# Patient Record
Sex: MALE | URBAN - METROPOLITAN AREA
[De-identification: names, ages, dates, MRNs, and addresses within clinical notes are randomized per-mention and may not be internally consistent; named-entity substitution may affect disease eponyms.]

---

## 2019-05-28 ENCOUNTER — DOCUMENTATION ONLY (OUTPATIENT)
Dept: MATERNAL FETAL MEDICINE | Facility: CLINIC | Age: 24
End: 2019-05-28

## 2019-05-28 DIAGNOSIS — Z31.440 ENCOUNTER OF MALE FOR TESTING FOR GENETIC DISEASE CARRIER STATUS FOR PROCREATIVE MANAGEMENT: Primary | ICD-10-CM

## 2019-05-28 NOTE — PROGRESS NOTES
5/28/2019      Impression/Plan:   1.  Khari accompanied his partner Srini Gallardo to her genetic counseling appointment in [facility] today to discuss her sickle cell trait results and implications for ongoing pregnancy.     2.  In order to clarify risks for her ongoing pregnancy, Khari had blood drawn for hemoglobin electrophoresis.  Results are expected within 2-3 days, and will be available in EPIC.  We will contact the couple to discuss the results, and a copy will be forwarded to the office of the referring OB provider.  Khari completed a consent to communicate with Srini at [phone] regarding his results.  Dvaid will be contacted at [phone].      Ja Valerio MS, [facility]  Licensed Genetic Counselor  Phone: [phone]  Pager: [phone]